# Patient Record
(demographics unavailable — no encounter records)

---

## 2025-06-11 NOTE — REVIEW OF SYSTEMS
[Myalgia] : myalgia  [Joint Pain] : joint pain [Fatigue] : no fatigue [Decreased Appetite] : appetite not decreased [Recent Weight Gain (___ Lbs)] : no recent weight gain [Recent Weight Loss (___ Lbs)] : no recent weight loss [Dysphagia] : no dysphagia [Visual Field Defect] : no visual field defect [Dry Eyes] : no dryness [Chest Pain] : no chest pain [Palpitations] : no palpitations [Lower Ext Edema] : no lower extremity edema [Shortness Of Breath] : no shortness of breath [Nausea] : no nausea [Cough] : no cough [Constipation] : no constipation [Vomiting] : no vomiting [Abdominal Pain] : no abdominal pain [Diarrhea] : no diarrhea [Polyuria] : no polyuria [Acanthosis] : no acanthosis  [Acne] : no acne [Dizziness] : no dizziness [Headaches] : no headaches [Tremors] : no tremors [Depression] : no depression [Insomnia] : no insomnia [Polydipsia] : no polydipsia [Cold Intolerance] : no cold intolerance [Heat Intolerance] : no heat intolerance [Easy Bruising] : no tendency for easy bruising [Easy Bleeding] : no ~M tendency for easy bleeding

## 2025-06-11 NOTE — PHYSICAL EXAM
[Alert] : alert [Well Nourished] : well nourished [PERRL] : pupils equal, round and reactive to light [No Acute Distress] : no acute distress [No Neck Mass] : no neck mass was observed [No LAD] : no lymphadenopathy [No Respiratory Distress] : no respiratory distress [Thyroid Not Enlarged] : the thyroid was not enlarged [Clear to Auscultation] : lungs were clear to auscultation bilaterally [No Accessory Muscle Use] : no accessory muscle use [Regular Rhythm] : with a regular rhythm [Normal Rate] : heart rate was normal [Normal S1, S2] : normal S1 and S2 [Normal Gait] : normal gait [No Tremors] : no tremors [Oriented x3] : oriented to person, place, and time

## 2025-06-11 NOTE — HISTORY OF PRESENT ILLNESS
[FreeTextEntry1] : Kiersten is a 53 yr old , here for osteoporosis She has been  diagnosed with RA, started hydroxychloroquine. Started on fosamax in 3/23.  had DXA scan in 4/24 , no sig. deterioration from previous one done on 2/22 but done in different locations.  Osteoporosis:  Diagnosed  by a bone density scan in  2/22. T score : Spine:-2.7 femoral neck: -2.1 hip total: -0.8 Before that DXA in 11/2020 showed osteopenia T score: Spine:-2.3 femoral neck: -1.5 hip total: -0.2 radial: -0.8  She was diagnosed with breast cancer in 2019 and started on Latrozole since 6 years.Plans to stop in another year.  Patient  denies history of fracture. She is currently being treated with calcium and vitamin D supplementation. Estimated dietary calcium is  mod. Patient dose reports height loss of > 2 inches. Patient has not  fallen > 2 times in the past year.   Patient participates in routine weight bearing exercises, walks daily.  No fractures, no kidneys stones in interim, feels well.  Past antiresorptive treatment: fosamax weekly started in 3/23 Tolerating it well.  Osteoporosis Risk Factors  Nonmodifiable Personal Hx of fracture as an adult: no Hx of fracture in first-degree relative: n/a, was adopted  race: yes Advanced age: no Female sex: yes Dementia: no Poor health/frailty: no Chronic Glucocorticoid Use : no Primary hyperparathyroidism  :no Chronic Seizure Medications  :no Breast Cancer with aromatase inhibitor use  yes, diagnosed in 2019 now on latrozole History of Kidney stone :no  Potentially modifiable: Tobacco use: yes once in a while, trying to quit Low body weight (<127 lbs): no Estrogen deficiency    early menopause (age <45) or bilateral ovariectomy: yes, 9/2019 with chemo    prolonged premenopausal amenorrhea(>1 yr): no    DEPOProvera use :no Low calcium intake (lifelong): no Alcoholism: no Recurrent falls: no Inadequate physical activity: no Vitamin D Deficiency no  Current calcium and Vit D intake: Dietary sources: small amount Supplements: takes calcium and vitamin D daily  Other Issues: left breast cancer triple positive.had radiation treatment.

## 2025-06-11 NOTE — ASSESSMENT
[Denosumab Therapy] : Risks  and benefits of denosumab therapy were discussed with the patient including eczema, cellulitis, osteonecrosis of the jaw and atypical femur fractures [Smoking Cessation] : smoking cessation [Bisphosphonate Therapy] : Risks and benefits of bisphosphonate therapy were  discussed with the patient including gastroesophageal irritation, osteonecrosis of the jaw, and atypical femur fractures, and acute phase reaction [FreeTextEntry1] : Kiersten is a 53 yr old , here for osteoporosis She has been  diagnosed with RA, started hydroxychloroquine. Started on fosamax in 3/23  had  DXA scan in 4/24 , no sig. deterioration from previous one done on 2/22 but done in different locations.  Osteoporosis: Diagnosed by a bone density scan in 2/22. T score : Spine:-2.7 femoral neck: -2.1 hip total: -0.8 Before that DXA in 11/2020 showed osteopenia T score: Spine:-2.3 femoral neck: -1.5 hip total: -0.2 radial: -0.8  She was diagnosed with breast cancer in 2019 and started on Latrozole. fosamax weekly started in 3/23 Tolerating it fine for now.   . Osteoporosis 1- Had DXA in 4/24 reviewed with patient.No sig. deterioration from previous one in 2/22 for now to continue fosamax.   2- Labs to rule out secondary causes: all came back normal  3. Dental evaluation Dental evaluation discussed in detail  4. The risks and benefits of my recommendations, as well as other treatment options were discussed with patient today. Questions were answered.  5. Lifestyle changes: Continue Calcium and vitamin D supplementation. Smoking cessation, smokes occasionally Alcohol max use 1 drink day women/ 2 drinks day men Daily weight bearing and postural exercises Dietary calcium  Smoking cessation discussed in detail with patient, 5 minutes spent is discussing adverse effects and ways to quit smoking. Patient voiced understanding.  Labs: calcium Phos PTH Mag Urine NTX Vitamin D Renal functions DXA scan next year  Follow up:1 yr